# Patient Record
Sex: MALE | Race: WHITE | NOT HISPANIC OR LATINO | Employment: FULL TIME | URBAN - METROPOLITAN AREA
[De-identification: names, ages, dates, MRNs, and addresses within clinical notes are randomized per-mention and may not be internally consistent; named-entity substitution may affect disease eponyms.]

---

## 2024-04-25 ENCOUNTER — OFFICE VISIT (OUTPATIENT)
Dept: CARDIOLOGY | Facility: CLINIC | Age: 59
End: 2024-04-25
Payer: COMMERCIAL

## 2024-04-25 VITALS
HEART RATE: 67 BPM | SYSTOLIC BLOOD PRESSURE: 124 MMHG | DIASTOLIC BLOOD PRESSURE: 84 MMHG | HEIGHT: 60 IN | BODY MASS INDEX: 45.75 KG/M2 | WEIGHT: 233 LBS

## 2024-04-25 DIAGNOSIS — R00.1 BRADYCARDIA: Primary | ICD-10-CM

## 2024-04-25 DIAGNOSIS — I10 PRIMARY HYPERTENSION: ICD-10-CM

## 2024-04-25 DIAGNOSIS — R42 DIZZINESS: ICD-10-CM

## 2024-04-25 PROCEDURE — 99204 OFFICE O/P NEW MOD 45 MIN: CPT | Performed by: INTERNAL MEDICINE

## 2024-04-25 RX ORDER — ASPIRIN 81 MG/1
81 TABLET ORAL DAILY
COMMUNITY

## 2024-04-25 RX ORDER — AMLODIPINE BESYLATE 5 MG/1
5 TABLET ORAL DAILY
COMMUNITY
Start: 2024-04-22

## 2024-04-25 RX ORDER — ALBUTEROL SULFATE 90 UG/1
2 AEROSOL, METERED RESPIRATORY (INHALATION) EVERY 4 HOURS PRN
COMMUNITY

## 2024-04-25 RX ORDER — TRAZODONE HYDROCHLORIDE 50 MG/1
1 TABLET ORAL DAILY
COMMUNITY
Start: 2024-04-22

## 2024-04-25 RX ORDER — LOSARTAN POTASSIUM 100 MG/1
100 TABLET ORAL DAILY
COMMUNITY
Start: 2024-04-16

## 2024-04-25 RX ORDER — LAMOTRIGINE 25 MG/1
1 TABLET ORAL DAILY
COMMUNITY
Start: 2024-04-22

## 2024-04-25 RX ORDER — BUSPIRONE HYDROCHLORIDE 15 MG/1
1 TABLET ORAL EVERY 12 HOURS SCHEDULED
COMMUNITY
Start: 2024-04-16

## 2024-04-25 RX ORDER — FLUTICASONE PROPIONATE 50 MCG
2 SPRAY, SUSPENSION (ML) NASAL DAILY
COMMUNITY
Start: 2024-03-15

## 2024-04-25 NOTE — ASSESSMENT & PLAN NOTE
Patient with baseline normal EKG it appears that his bradycardic issues are resolved after discontinuing his beta-blocker will get a 48-hour monitor to monitor for trends as well as an echocardiogram to make sure no structural issues unclear if some of these issues may have been just postviral in nature do not feel that his symptoms are primarily related to any rate related issues of bradycardia would avoid beta-blockers.  Will see what his heart rate trend looks like on the Holter monitor results

## 2024-04-25 NOTE — ASSESSMENT & PLAN NOTE
Patient with intermittent dizzy spells a lot of times occurring when he is standing today in the office he was orthostatic with a 22 mm drop in his systolic blood pressure and mildly dizzy in nature recommended that he titrate down his Norvasc to 2.5 a day and see if this improves the issues.  Would also encourage fluid hydration and consideration of compression stockings as ways to better combat dizziness spells as well.

## 2024-05-16 ENCOUNTER — TELEPHONE (OUTPATIENT)
Dept: CARDIOLOGY | Facility: CLINIC | Age: 59
End: 2024-05-16
Payer: COMMERCIAL

## 2024-05-16 PROBLEM — I47.10 PSVT (PAROXYSMAL SUPRAVENTRICULAR TACHYCARDIA): Status: ACTIVE | Noted: 2024-05-16

## 2024-05-16 NOTE — TELEPHONE ENCOUNTER
Called and discussed results of Holter monitor with patient brief SVT episode about 6 seconds appeared fairly regular rate and about the 160s to 70s patient was asymptomatic at that time and not having any other symptoms suggestive of SVT recommended just follow-up with continued serial monitoring for more sustained symptomatic episodes if so could try a low-dose of a calcium channel blocker for suppression

## 2024-05-31 ENCOUNTER — HOSPITAL ENCOUNTER (OUTPATIENT)
Dept: CARDIOLOGY | Facility: HOSPITAL | Age: 59
Discharge: HOME | End: 2024-05-31
Payer: COMMERCIAL

## 2024-05-31 ENCOUNTER — TELEPHONE (OUTPATIENT)
Dept: CARDIOLOGY | Facility: CLINIC | Age: 59
End: 2024-05-31
Payer: COMMERCIAL

## 2024-05-31 DIAGNOSIS — R00.1 BRADYCARDIA: ICD-10-CM

## 2024-05-31 DIAGNOSIS — I77.810 AORTIC ROOT DILATION: Primary | ICD-10-CM

## 2024-05-31 LAB
BH CV ECHO MEAS - ACS: 2.3 CM
BH CV ECHO MEAS - AO MAX PG: 7.1 MMHG
BH CV ECHO MEAS - AO MEAN PG: 4 MMHG
BH CV ECHO MEAS - AO ROOT DIAM: 4.1 CM
BH CV ECHO MEAS - AO V2 MAX: 133 CM/SEC
BH CV ECHO MEAS - AO V2 VTI: 29.8 CM
BH CV ECHO MEAS - AVA(I,D): 2.7 CM2
BH CV ECHO MEAS - EDV(CUBED): 97.3 ML
BH CV ECHO MEAS - EDV(MOD-SP2): 106 ML
BH CV ECHO MEAS - EDV(MOD-SP4): 84 ML
BH CV ECHO MEAS - EF(MOD-BP): 63.5 %
BH CV ECHO MEAS - EF(MOD-SP2): 67.6 %
BH CV ECHO MEAS - EF(MOD-SP4): 57.6 %
BH CV ECHO MEAS - ESV(CUBED): 35.9 ML
BH CV ECHO MEAS - ESV(MOD-SP2): 34.3 ML
BH CV ECHO MEAS - ESV(MOD-SP4): 35.6 ML
BH CV ECHO MEAS - FS: 28.3 %
BH CV ECHO MEAS - IVS/LVPW: 1.09 CM
BH CV ECHO MEAS - IVSD: 1.2 CM
BH CV ECHO MEAS - LA DIMENSION: 4.6 CM
BH CV ECHO MEAS - LAT PEAK E' VEL: 9.5 CM/SEC
BH CV ECHO MEAS - LV DIASTOLIC VOL/BSA (35-75): 38.2 CM2
BH CV ECHO MEAS - LV MASS(C)D: 192.9 GRAMS
BH CV ECHO MEAS - LV MAX PG: 4.8 MMHG
BH CV ECHO MEAS - LV MEAN PG: 2 MMHG
BH CV ECHO MEAS - LV SYSTOLIC VOL/BSA (12-30): 16.2 CM2
BH CV ECHO MEAS - LV V1 MAX: 110 CM/SEC
BH CV ECHO MEAS - LV V1 VTI: 25.2 CM
BH CV ECHO MEAS - LVIDD: 4.6 CM
BH CV ECHO MEAS - LVIDS: 3.3 CM
BH CV ECHO MEAS - LVOT AREA: 3.1 CM2
BH CV ECHO MEAS - LVOT DIAM: 2 CM
BH CV ECHO MEAS - LVPWD: 1.1 CM
BH CV ECHO MEAS - MED PEAK E' VEL: 8.9 CM/SEC
BH CV ECHO MEAS - MV A MAX VEL: 71.3 CM/SEC
BH CV ECHO MEAS - MV DEC TIME: 0.17 SEC
BH CV ECHO MEAS - MV E MAX VEL: 87 CM/SEC
BH CV ECHO MEAS - MV E/A: 1.22
BH CV ECHO MEAS - RAP SYSTOLE: 3 MMHG
BH CV ECHO MEAS - RVSP: 27.6 MMHG
BH CV ECHO MEAS - SV(LVOT): 79.2 ML
BH CV ECHO MEAS - SV(MOD-SP2): 71.7 ML
BH CV ECHO MEAS - SV(MOD-SP4): 48.4 ML
BH CV ECHO MEAS - SVI(LVOT): 36 ML/M2
BH CV ECHO MEAS - SVI(MOD-SP2): 32.6 ML/M2
BH CV ECHO MEAS - SVI(MOD-SP4): 22 ML/M2
BH CV ECHO MEAS - TAPSE (>1.6): 2.35 CM
BH CV ECHO MEAS - TR MAX PG: 24.6 MMHG
BH CV ECHO MEAS - TR MAX VEL: 248 CM/SEC
BH CV ECHO MEASUREMENTS AVERAGE E/E' RATIO: 9.46
LEFT ATRIUM VOLUME INDEX: 24.6 ML/M2

## 2024-05-31 PROCEDURE — 93306 TTE W/DOPPLER COMPLETE: CPT

## 2024-05-31 NOTE — TELEPHONE ENCOUNTER
Anay Zheng APRN French, Tonya, JUDI  Echocardiogram shows normal heart muscle function and no significant sarmad disease noted. There is borderline dilation of aortic root, recommend CT Chest to further evaluate.      Attempted to call patient. No answer. VM left with return call requested.

## 2024-06-24 DIAGNOSIS — I77.810 AORTIC ROOT DILATION: ICD-10-CM

## 2024-06-28 ENCOUNTER — TELEPHONE (OUTPATIENT)
Dept: CARDIOLOGY | Facility: CLINIC | Age: 59
End: 2024-06-28
Payer: COMMERCIAL

## 2024-06-28 NOTE — TELEPHONE ENCOUNTER
----- Message from Kamilla Nelson sent at 6/27/2024  1:18 PM EDT -----  CT does show dilation and ectasia of the aortic root.  The images are a little less than optimal due to some motion during the CT.  Would likely consider repeating in 1 year to reevaluate for aneurysm, can be discussed at follow-up appointment in November.

## 2024-06-28 NOTE — TELEPHONE ENCOUNTER
Caller: SAMEER PARKER    Relationship: Emergency Contact    Best call back number: [phone]     Caller requesting test results: SAMEER PARKER    What test was performed: CT CHEST    When was the test performed: 06.21.24    Additional notes: SCANNED INTO CHART

## 2024-12-05 ENCOUNTER — TELEPHONE (OUTPATIENT)
Dept: CARDIOLOGY | Facility: CLINIC | Age: 59
End: 2024-12-05
Payer: COMMERCIAL

## 2024-12-05 NOTE — TELEPHONE ENCOUNTER
Caller: Gael Perales    Relationship: Self    Best call back number: [phone]    What is the best time to reach you: ANYTIME     Who are you requesting to speak with (clinical staff, provider,  specific staff member): NURSE     What was the call regarding: PT CALLED IN STATING HE HAS MET HIS DEDUCTIBLE AND IS WONDERING IF DR SWANN OR DENISA HARRISON WERE GOING TO ORDER ANY IMAGING TO BE DONE, IF THOSE COULD GET SCHEDULED BEFORE 2025. PT THINKS DURING LAST APPT THEY HAD TALKED ABOUT POSSIBLY ORDERING A CT SCAN, AND HE'D LIKE TO GET THAT DONE BEFORE THIS YEAR IS OVER. PLEASE ADVISE AND CALL BACK TO DISCUSS.     Is it okay if the provider responds through MyChart: CALL BACK

## 2025-01-30 ENCOUNTER — OFFICE VISIT (OUTPATIENT)
Dept: CARDIOLOGY | Facility: CLINIC | Age: 60
End: 2025-01-30
Payer: COMMERCIAL

## 2025-01-30 VITALS
SYSTOLIC BLOOD PRESSURE: 131 MMHG | BODY MASS INDEX: 34.19 KG/M2 | HEART RATE: 82 BPM | HEIGHT: 71 IN | DIASTOLIC BLOOD PRESSURE: 90 MMHG | WEIGHT: 244.2 LBS

## 2025-01-30 DIAGNOSIS — I47.10 PSVT (PAROXYSMAL SUPRAVENTRICULAR TACHYCARDIA): Primary | ICD-10-CM

## 2025-01-30 DIAGNOSIS — I77.810 AORTIC ROOT DILATATION: ICD-10-CM

## 2025-01-30 DIAGNOSIS — I10 ESSENTIAL HYPERTENSION: ICD-10-CM

## 2025-01-30 PROBLEM — R00.1 BRADYCARDIA: Status: RESOLVED | Noted: 2024-04-25 | Resolved: 2025-01-30

## 2025-01-30 PROBLEM — F43.10 PTSD (POST-TRAUMATIC STRESS DISORDER): Status: ACTIVE | Noted: 2025-01-30

## 2025-01-30 PROBLEM — G47.30 SLEEP APNEA: Status: ACTIVE | Noted: 2025-01-30

## 2025-01-30 PROBLEM — R42 DIZZINESS: Status: RESOLVED | Noted: 2024-04-25 | Resolved: 2025-01-30

## 2025-01-30 PROCEDURE — 99214 OFFICE O/P EST MOD 30 MIN: CPT | Performed by: NURSE PRACTITIONER

## 2025-01-30 RX ORDER — ONDANSETRON 4 MG/1
4 TABLET, FILM COATED ORAL EVERY 6 HOURS PRN
COMMUNITY
Start: 2024-11-27

## 2025-01-30 RX ORDER — ATOMOXETINE 60 MG/1
60 CAPSULE ORAL EVERY MORNING
COMMUNITY
Start: 2025-01-13

## 2025-01-30 NOTE — PROGRESS NOTES
Chief Complaint  Follow-up and PSVT    Subjective            History of Present Illness  Gael Perales is a 59-year-old male patient who presents to the office today for follow-up.  He has paroxysmal SVT, hypertension, and aortic root dilation.  He reports that his PCP recently reduced amlodipine dose to 2.5 mg daily due to orthostatic hypotension on 5 mg dose.  He is compliant with medication.  He denies any new or worsening cardiac symptoms today.    PMH  Past Medical History:   Diagnosis Date    Hypertension          ALLERGY  No Known Allergies       SURGICALHX  History reviewed. No pertinent surgical history.       SOC  Social History     Socioeconomic History    Marital status:    Tobacco Use    Smoking status: Never    Smokeless tobacco: Never   Vaping Use    Vaping status: Never Used   Substance and Sexual Activity    Alcohol use: Yes    Drug use: Never         FAMHX  History reviewed. No pertinent family history.       MEDSIGONLY  Current Outpatient Medications on File Prior to Visit   Medication Sig    albuterol sulfate  (90 Base) MCG/ACT inhaler Inhale 2 puffs Every 4 (Four) Hours As Needed for Wheezing.    amLODIPine (NORVASC) 5 MG tablet Take 1 tablet by mouth Daily. (Patient taking differently: Take 0.5 tablets by mouth Daily.)    aspirin 81 MG EC tablet Take 1 tablet by mouth Daily.    atomoxetine (STRATTERA) 60 MG capsule Take 1 capsule by mouth Every Morning.    busPIRone (BUSPAR) 15 MG tablet Take 1 tablet by mouth Every 12 (Twelve) Hours.    fluticasone (FLONASE) 50 MCG/ACT nasal spray Administer 2 sprays into the nostril(s) as directed by provider Daily.    lamoTRIgine (LaMICtal) 25 MG tablet Take 1 tablet by mouth Daily. (Patient taking differently: Take 1 tablet by mouth Daily. 75mg daily)    losartan (COZAAR) 100 MG tablet Take 1 tablet by mouth Daily. for high blood pressure    ondansetron (ZOFRAN) 4 MG tablet Take 1 tablet by mouth Every 6 (Six) Hours As Needed for  "Nausea or Vomiting.    traZODone (DESYREL) 50 MG tablet Take 1 tablet by mouth Daily.    [DISCONTINUED] multivitamin with minerals (CENTRUM SILVER 50+MEN PO) Take 1 tablet by mouth Daily. (Patient not taking: Reported on 1/30/2025)     No current facility-administered medications on file prior to visit.         Objective   /90   Pulse 82   Ht 180.3 cm (71\")   Wt 111 kg (244 lb 3.2 oz)   BMI 34.06 kg/m²       Physical Exam  Constitutional:       Appearance: He is obese.   HENT:      Head: Normocephalic.   Neck:      Vascular: No carotid bruit.   Cardiovascular:      Rate and Rhythm: Normal rate and regular rhythm.      Pulses: Normal pulses.      Heart sounds: Normal heart sounds. No murmur heard.  Pulmonary:      Effort: Pulmonary effort is normal.      Breath sounds: Normal breath sounds.   Musculoskeletal:      Cervical back: Neck supple.      Right lower leg: No edema.      Left lower leg: No edema.   Skin:     General: Skin is dry.   Neurological:      Mental Status: He is alert and oriented to person, place, and time.   Psychiatric:         Behavior: Behavior normal.       Result Review :   The following data was reviewed by: DENISA Stevenson on 01/30/2025:  No results found for: \"PROBNP\"     No results found for: \"TSH\"   No results found for: \"FREET4\"   No results found for: \"DDIMERQUANT\"  No results found for: \"MG\"   No results found for: \"DIGOXIN\"   No results found for: \"TROPONINT\"          Results for orders placed during the hospital encounter of 05/31/24    Adult Transthoracic Echo Complete W/ Cont if Necessary Per Protocol    Interpretation Summary    Left ventricular systolic function is normal. Calculated left ventricular EF = 63.5%    Left ventricular diastolic function was normal.    Estimated right ventricular systolic pressure from tricuspid regurgitation is normal (<35 mmHg).    Borderline dilation of the aortic root is present.           Assessment and Plan    Diagnoses and all " orders for this visit:    1. PSVT (paroxysmal supraventricular tachycardia) (Primary)  Symptomatically stable at this time, rate controlled, continue to monitor for worsening symptoms.  Avoid caffeine products.    2. Essential hypertension  Currently controlled and without adverse effect from medication, continue amlodipine 2.5 mg daily and losartan 100 mg daily.  Low-sodium diet discussed.    3. Aortic root dilatation  Last CT chest performed on 6/21/2024 shows slight dilation of the aortic root measuring 4.1 cm.  Continue to monitor with yearly CT chest which will be due in June 2025.  -     CT Chest Without Contrast; Future            Follow Up   Return in about 6 months (around 7/30/2025) for Follow up with Dr Rosas.    Patient was given instructions and counseling regarding his condition or for health maintenance advice. Please see specific information pulled into the AVS if appropriate.     Gael Perales  reports that he has never smoked. He has never used smokeless tobacco. MOINCA Zheng, APRN  01/30/25  08:40 EST    Dictated Utilizing Dragon Dictation

## 2025-02-17 ENCOUNTER — TELEPHONE (OUTPATIENT)
Dept: CARDIOLOGY | Facility: CLINIC | Age: 60
End: 2025-02-17
Payer: COMMERCIAL

## 2025-02-17 DIAGNOSIS — I77.810 AORTIC ROOT DILATATION: ICD-10-CM

## 2025-02-17 DIAGNOSIS — I77.810 AORTIC ROOT DILATATION: Primary | ICD-10-CM

## 2025-02-17 NOTE — TELEPHONE ENCOUNTER
----- Message from Anay Zheng sent at 2/17/2025  9:55 AM EST -----  CT Chest shows aortic root is not dilated at this time, only  mild dilation on previous CT Chest. Recommend to continue to monitor with repeat CT Chest in one year  ----- Message -----  From: Paloma Tompkins MA  Sent: 2/17/2025   9:31 AM EST  To: DENISA Reid      ----- Message -----  From: Dasia Cazares RegSched Rep  Sent: 2/17/2025   9:16 AM EST  To: [facility]

## 2025-08-27 ENCOUNTER — OFFICE VISIT (OUTPATIENT)
Dept: CARDIOLOGY | Facility: CLINIC | Age: 60
End: 2025-08-27
Payer: COMMERCIAL

## 2025-08-27 ENCOUNTER — LAB (OUTPATIENT)
Facility: HOSPITAL | Age: 60
End: 2025-08-27
Payer: COMMERCIAL

## 2025-08-27 VITALS
HEART RATE: 71 BPM | SYSTOLIC BLOOD PRESSURE: 139 MMHG | HEIGHT: 71 IN | BODY MASS INDEX: 33.88 KG/M2 | WEIGHT: 242 LBS | DIASTOLIC BLOOD PRESSURE: 85 MMHG

## 2025-08-27 DIAGNOSIS — I47.10 PSVT (PAROXYSMAL SUPRAVENTRICULAR TACHYCARDIA): ICD-10-CM

## 2025-08-27 DIAGNOSIS — I10 ESSENTIAL HYPERTENSION: ICD-10-CM

## 2025-08-27 DIAGNOSIS — I77.810 AORTIC ROOT DILATATION: ICD-10-CM

## 2025-08-27 DIAGNOSIS — I10 PRIMARY HYPERTENSION: ICD-10-CM

## 2025-08-27 DIAGNOSIS — I10 PRIMARY HYPERTENSION: Primary | ICD-10-CM

## 2025-08-27 LAB
ALBUMIN SERPL-MCNC: 4.3 G/DL (ref 3.5–5.2)
ALP SERPL-CCNC: 87 U/L (ref 39–117)
ALT SERPL W P-5'-P-CCNC: 22 U/L (ref 1–41)
ANION GAP SERPL CALCULATED.3IONS-SCNC: 11.8 MMOL/L (ref 5–15)
AST SERPL-CCNC: 18 U/L (ref 1–40)
BILIRUB CONJ SERPL-MCNC: <0.1 MG/DL (ref 0–0.3)
BILIRUB INDIRECT SERPL-MCNC: NORMAL MG/DL
BILIRUB SERPL-MCNC: 0.3 MG/DL (ref 0–1.2)
BUN SERPL-MCNC: 17 MG/DL (ref 6–20)
BUN/CREAT SERPL: 12.6 (ref 7–25)
CALCIUM SPEC-SCNC: 9.6 MG/DL (ref 8.6–10.5)
CHLORIDE SERPL-SCNC: 105 MMOL/L (ref 98–107)
CHOLEST SERPL-MCNC: 191 MG/DL (ref 0–200)
CO2 SERPL-SCNC: 24.2 MMOL/L (ref 22–29)
CREAT SERPL-MCNC: 1.35 MG/DL (ref 0.76–1.27)
EGFRCR SERPLBLD CKD-EPI 2021: 60.9 ML/MIN/1.73
GLUCOSE SERPL-MCNC: 93 MG/DL (ref 65–99)
HDLC SERPL-MCNC: 53 MG/DL (ref 40–60)
LDLC SERPL CALC-MCNC: 118 MG/DL (ref 0–100)
LDLC/HDLC SERPL: 2.17 {RATIO}
POTASSIUM SERPL-SCNC: 4.2 MMOL/L (ref 3.5–5.2)
PROT SERPL-MCNC: 7.3 G/DL (ref 6–8.5)
SODIUM SERPL-SCNC: 141 MMOL/L (ref 136–145)
TRIGL SERPL-MCNC: 114 MG/DL (ref 0–150)
VLDLC SERPL-MCNC: 20 MG/DL (ref 5–40)

## 2025-08-27 PROCEDURE — 36415 COLL VENOUS BLD VENIPUNCTURE: CPT

## 2025-08-27 PROCEDURE — 80048 BASIC METABOLIC PNL TOTAL CA: CPT

## 2025-08-27 PROCEDURE — 99214 OFFICE O/P EST MOD 30 MIN: CPT | Performed by: INTERNAL MEDICINE

## 2025-08-27 PROCEDURE — 80076 HEPATIC FUNCTION PANEL: CPT

## 2025-08-27 PROCEDURE — 80061 LIPID PANEL: CPT

## 2025-08-27 RX ORDER — LOSARTAN POTASSIUM 100 MG/1
100 TABLET ORAL DAILY
Qty: 90 TABLET | Refills: 3 | Status: SHIPPED | OUTPATIENT
Start: 2025-08-27

## 2025-08-27 RX ORDER — AMLODIPINE BESYLATE 5 MG/1
5 TABLET ORAL DAILY
Qty: 90 TABLET | Refills: 3 | Status: SHIPPED | OUTPATIENT
Start: 2025-08-27

## 2025-08-27 RX ORDER — PREDNISONE 10 MG/1
10 TABLET ORAL
COMMUNITY
Start: 2025-06-04

## 2025-08-29 ENCOUNTER — RESULTS FOLLOW-UP (OUTPATIENT)
Dept: CARDIOLOGY | Facility: CLINIC | Age: 60
End: 2025-08-29
Payer: COMMERCIAL

## 2025-08-29 DIAGNOSIS — E78.5 HYPERLIPIDEMIA, UNSPECIFIED HYPERLIPIDEMIA TYPE: Primary | ICD-10-CM
